# Patient Record
Sex: FEMALE | Race: BLACK OR AFRICAN AMERICAN | NOT HISPANIC OR LATINO | Employment: UNEMPLOYED | URBAN - METROPOLITAN AREA
[De-identification: names, ages, dates, MRNs, and addresses within clinical notes are randomized per-mention and may not be internally consistent; named-entity substitution may affect disease eponyms.]

---

## 2019-09-30 ENCOUNTER — OFFICE VISIT CONVERTED (OUTPATIENT)
Dept: PLASTIC SURGERY | Facility: CLINIC | Age: 29
End: 2019-09-30
Attending: PLASTIC SURGERY

## 2019-09-30 ENCOUNTER — CONVERSION ENCOUNTER (OUTPATIENT)
Dept: OTHER | Facility: HOSPITAL | Age: 29
End: 2019-09-30

## 2019-10-01 ENCOUNTER — HOSPITAL ENCOUNTER (OUTPATIENT)
Dept: MAMMOGRAPHY | Facility: HOSPITAL | Age: 29
Discharge: HOME | End: 2019-10-01
Attending: PLASTIC SURGERY

## 2019-10-08 ENCOUNTER — HOSPITAL ENCOUNTER (OUTPATIENT)
Dept: MAMMOGRAPHY | Facility: HOSPITAL | Age: 29
Discharge: HOME | End: 2019-10-08
Attending: INTERNAL MEDICINE

## 2019-10-30 ENCOUNTER — HOSPITAL ENCOUNTER (OUTPATIENT)
Dept: ULTRASOUND IMAGING | Facility: HOSPITAL | Age: 29
Discharge: HOME | End: 2019-10-30
Attending: PLASTIC SURGERY

## 2019-11-08 ENCOUNTER — OFFICE VISIT CONVERTED (OUTPATIENT)
Dept: PLASTIC SURGERY | Facility: CLINIC | Age: 29
End: 2019-11-08
Attending: PLASTIC SURGERY

## 2019-11-20 ENCOUNTER — HOSPITAL ENCOUNTER (OUTPATIENT)
Dept: PERIOP | Facility: HOSPITAL | Age: 29
Setting detail: HOSPITAL OUTPATIENT SURGERY
Discharge: HOME | End: 2019-11-20
Attending: PLASTIC SURGERY

## 2019-11-20 LAB — HCG UR QL: NEGATIVE

## 2019-11-21 ENCOUNTER — PROCEDURE VISIT CONVERTED (OUTPATIENT)
Dept: OTHER | Facility: HOSPITAL | Age: 29
End: 2019-11-21
Attending: PLASTIC SURGERY

## 2019-11-22 ENCOUNTER — OFFICE VISIT CONVERTED (OUTPATIENT)
Dept: PLASTIC SURGERY | Facility: CLINIC | Age: 29
End: 2019-11-22
Attending: PLASTIC SURGERY

## 2019-11-25 ENCOUNTER — CONVERSION ENCOUNTER (OUTPATIENT)
Dept: PLASTIC SURGERY | Facility: CLINIC | Age: 29
End: 2019-11-25

## 2019-11-25 ENCOUNTER — OFFICE VISIT CONVERTED (OUTPATIENT)
Dept: PLASTIC SURGERY | Facility: CLINIC | Age: 29
End: 2019-11-25
Attending: PLASTIC SURGERY

## 2019-12-09 ENCOUNTER — OFFICE VISIT CONVERTED (OUTPATIENT)
Dept: PLASTIC SURGERY | Facility: CLINIC | Age: 29
End: 2019-12-09
Attending: PLASTIC SURGERY

## 2020-05-04 ENCOUNTER — TELEMEDICINE CONVERTED (OUTPATIENT)
Dept: PLASTIC SURGERY | Facility: CLINIC | Age: 30
End: 2020-05-04
Attending: PLASTIC SURGERY

## 2021-05-13 ENCOUNTER — OFFICE VISIT CONVERTED (OUTPATIENT)
Dept: PLASTIC SURGERY | Facility: CLINIC | Age: 31
End: 2021-05-13
Attending: PLASTIC SURGERY

## 2021-05-13 NOTE — PROGRESS NOTES
"   Progress Note      Patient Name: Rio Sanchez   Patient ID: [id number]   Sex: Female   YOB: 1990    Referring Provider: Jane WILD    Visit Date: May 4, 2020    Provider: Brandi Hendrickson MD   Location: [facility]   Location Address: [address] [place] [address]  [id number]   Location Phone: [phone]          History Of Present Illness  Rio Sanchez is a 30 year old /Black female who presents to the office today as a consult from Jane WILD.   Rio Sanchez is a 29 year old /Black female who presents to the office today for a follow up visit status post breast reduction with free nipple grafts on 11/20/2019. She is doing very well.   Pain Rating on the [place]'s Scale is: none   Video Conferencing Visit  Rio Sanchez is a 30 year old /Black female who is presenting for evaluation via video conferencing. Verbal consent obtained before beginning visit. Doing well, has some numbness on lateral sides of breast. Has had some small pimple like bumps on nipple and some hypopigmentation   The following staff were present during this visit: HARPAL Hendricks       Vitals  Date Time BP Position Site L\R Cuff Size HR RR TEMP (F) WT  HT  BMI kg/m2 BSA m2 O2 Sat HC       12/09/2019 11:32 /83 Sitting    97 - R   313lbs 16oz 5'  6\" 50.68 2.58 100 %          Physical Examination  · Constitutional  o Appearance  o : well developed, well-nourished, Alert and oriented X3  · Respiratory  o Respiratory Effort  o : breathing unlabored   · Skin and Subcutaneous Tissue  o Surgical Incision  o : nipple grafts viable, well healed with with scattered hypopigmentation, good symmetry, improved contour          Assessment  · Macromastia       Hypertrophy of breast     611.1/N62      Plan  · Orders  o Mammogram breast screening 2D digital bilateral. (, 64236) - 611.1/N62 - 06/08/2020   New baseline post breast " reduction  o Plastics reconstructive visit ([facility]) - - 05/04/2020  · Medications  o Medications have been Reconciled  o Transition of Care or Provider Policy  · Instructions  o aquaphor and scar massage, continue bra, f/u after mammo in late June, will discuss potential nipple tattoo at that time to address hypopigmentation            Electronically Signed by: Brandi Hendrickson MD -Author on May 4, 2020 03:08:55 PM

## 2021-05-15 VITALS
SYSTOLIC BLOOD PRESSURE: 117 MMHG | HEART RATE: 107 BPM | OXYGEN SATURATION: 98 % | DIASTOLIC BLOOD PRESSURE: 76 MMHG | HEIGHT: 66 IN

## 2021-05-15 VITALS
SYSTOLIC BLOOD PRESSURE: 109 MMHG | HEART RATE: 76 BPM | DIASTOLIC BLOOD PRESSURE: 68 MMHG | OXYGEN SATURATION: 99 % | HEIGHT: 66 IN

## 2021-05-15 VITALS
OXYGEN SATURATION: 100 % | HEART RATE: 97 BPM | BODY MASS INDEX: 47.09 KG/M2 | DIASTOLIC BLOOD PRESSURE: 83 MMHG | SYSTOLIC BLOOD PRESSURE: 126 MMHG | HEIGHT: 66 IN | WEIGHT: 293 LBS

## 2021-05-15 VITALS
WEIGHT: 293 LBS | HEART RATE: 76 BPM | HEIGHT: 66 IN | OXYGEN SATURATION: 97 % | DIASTOLIC BLOOD PRESSURE: 80 MMHG | BODY MASS INDEX: 47.09 KG/M2 | SYSTOLIC BLOOD PRESSURE: 116 MMHG

## 2021-05-15 VITALS
OXYGEN SATURATION: 99 % | BODY MASS INDEX: 47.09 KG/M2 | DIASTOLIC BLOOD PRESSURE: 92 MMHG | WEIGHT: 293 LBS | SYSTOLIC BLOOD PRESSURE: 140 MMHG | HEART RATE: 87 BPM | HEIGHT: 66 IN

## 2021-06-05 NOTE — PROGRESS NOTES
Progress Note      Patient Name: Rio Sanchez   Patient ID: [id number]   Sex: Female   YOB: 1990    Referring Provider: Jane WILD    Visit Date: May 13, 2021    Provider: Brandi Hendrickson MD   Location: [facility]   Location Address: [address] [place] [address]  [id number]   Location Phone: [phone]          History Of Present Illness  Rio Sanchez is a 31 year old /Black female who presents to the office today as a consult from Jane WILD.   Rio Sanchez is a 29 year old /Black female who presents to the office today for a follow up visit status post breast reduction with free nipple grafts on 11/20/2019. She is doing very well.   Pain Rating on the [place]'s Scale is: none      follow up bilateral breast reduction 11/2019.  Doing well.  Needs mammogram scheduled and has some hypopigmentation of bilateral nipples       Past Medical History  Anxiety; Asthma; Depression; Macromastia         Past Surgical History  breast reduction; Cesarian Section         Medication List  buspirone 10 mg oral tablet; diclofenac sodium oral; nystatin 100,000 unit/gram topical powder         Allergy List  NO KNOWN DRUG ALLERGIES         Family Medical History  Stroke; Heart Disease; Lymphoma, Malignant; Diabetes         Social History  Alcohol Use (Current some day); lives with other; Recreational Drug Use (Never); Single.; Student.; Tobacco (Former)         Physical Examination  · Constitutional  o Appearance  o : well developed, well-nourished, Alert and oriented X3  · Respiratory  o Respiratory Effort  o : breathing unlabored   · Cardiovascular  o Heart  o : regular rate  · Skin and Subcutaneous Tissue  o Surgical Incision  o : Scars well-healed, nipples healed with scattered hypopigmentation, breasts soft, good symmetry          Assessment  · Macromastia       Hypertrophy of  breast     611.1/N62      Plan  · Orders  o Plastics reconstructive visit ([facility]) - - 05/13/2021  o Mammogram breast screening 2D digital bilateral. (07733, ) - 611.1/N62 - 05/13/2021   S/p breast reduction 11/20/2019. New baseline mammogram.   · Medications  o Medications have been Reconciled  o Transition of Care or Provider Policy  · Instructions  o Patient to follow-up after mammogram and will plan on nipple tattoo to address hypopigmentation  o Electronically Identified Patient Education Materials Provided Electronically            Electronically Signed by: Brandi Hendrickson MD -Author on May 14, 2021 06:01:08 PM

## 2021-10-22 ENCOUNTER — HOSPITAL ENCOUNTER (EMERGENCY)
Facility: HOSPITAL | Age: 31
Discharge: HOME | End: 2021-10-22
Attending: EMERGENCY MEDICINE | Admitting: EMERGENCY MEDICINE

## 2021-10-22 VITALS
WEIGHT: 293 LBS | SYSTOLIC BLOOD PRESSURE: 126 MMHG | HEIGHT: 67 IN | RESPIRATION RATE: 16 BRPM | OXYGEN SATURATION: 100 % | DIASTOLIC BLOOD PRESSURE: 68 MMHG | BODY MASS INDEX: 45.99 KG/M2 | HEART RATE: 67 BPM

## 2021-10-22 DIAGNOSIS — K02.9 PAIN DUE TO DENTAL CARIES: Primary | ICD-10-CM

## 2021-10-22 PROCEDURE — 96372 THER/PROPH/DIAG INJ SC/IM: CPT

## 2021-10-22 PROCEDURE — 25010000002 KETOROLAC TROMETHAMINE PER 15 MG: Performed by: PHYSICIAN ASSISTANT

## 2021-10-22 PROCEDURE — 99283 EMERGENCY DEPT VISIT LOW MDM: CPT

## 2021-10-22 RX ORDER — HYDROCODONE BITARTRATE AND ACETAMINOPHEN 5; 325 MG/1; MG/1
1 TABLET ORAL EVERY 6 HOURS PRN
COMMUNITY

## 2021-10-22 RX ORDER — IBUPROFEN 200 MG
200 TABLET ORAL EVERY 6 HOURS PRN
COMMUNITY

## 2021-10-22 RX ORDER — CLINDAMYCIN HYDROCHLORIDE 150 MG/1
150 CAPSULE ORAL 3 TIMES DAILY
COMMUNITY

## 2021-10-22 RX ORDER — KETOROLAC TROMETHAMINE 30 MG/ML
30 INJECTION, SOLUTION INTRAMUSCULAR; INTRAVENOUS ONCE
Status: COMPLETED | OUTPATIENT
Start: 2021-10-22 | End: 2021-10-22

## 2021-10-22 RX ORDER — ACETAMINOPHEN 160 MG/5ML
325 SOLUTION ORAL ONCE
Status: COMPLETED | OUTPATIENT
Start: 2021-10-22 | End: 2021-10-22

## 2021-10-22 RX ORDER — LIDOCAINE HYDROCHLORIDE 20 MG/ML
10 SOLUTION OROPHARYNGEAL
Status: DISCONTINUED | OUTPATIENT
Start: 2021-10-22 | End: 2021-10-22 | Stop reason: HOSPADM

## 2021-10-22 RX ADMIN — BENZOCAINE 2 SPRAY: 200 SPRAY DENTAL; ORAL; PERIODONTAL at 17:11

## 2021-10-22 RX ADMIN — ACETAMINOPHEN 325 MG: 160 SOLUTION ORAL at 17:12

## 2021-10-22 RX ADMIN — KETOROLAC TROMETHAMINE 30 MG: 30 INJECTION, SOLUTION INTRAMUSCULAR; INTRAVENOUS at 17:11

## 2021-10-22 RX ADMIN — LIDOCAINE HYDROCHLORIDE 10 ML: 20 SOLUTION ORAL; TOPICAL at 17:12

## 2021-10-22 NOTE — DISCHARGE INSTRUCTIONS
Patient should continue alternating Tylenol and ibuprofen for pain relief at home.  Alternate hot and cold packs for relief of swelling and pain to the right side of the face.  Continue taking clindamycin as prescribed.  Follow-up with dentist on Monday for tooth extraction.

## 2021-10-22 NOTE — ED PROVIDER NOTES
Subjective   31-year-old female presents to the emergency department with complaints of right-sided dental pain and swelling x2 weeks.  Patient reports seen dentist last week for preliminary assessment prior to having tooth extracted.  She was placed on antibiotics and given Norco's for pain relief.  She states she is continue to have pain and swelling has not gotten any better.  She is scheduled for extraction on Monday, 2021.  Pain currently 9 out of 10, with radiation of the right side of her face into her ear consistent with nerve pain.  Nothing makes pain worse, but icewater makes it better. She denies any fevers or chills.  She has no other complaints or concerns at this time.  Patient here seeking control of pain.      History provided by:  Patient   used: No        Review of Systems   Constitutional: Negative for chills and fever.   HENT: Positive for dental problem. Negative for congestion, ear pain and sore throat.    Eyes: Negative for pain.   Respiratory: Negative for cough, chest tightness and shortness of breath.    Cardiovascular: Negative for chest pain.   Gastrointestinal: Negative for abdominal pain, diarrhea, nausea and vomiting.   Genitourinary: Negative for flank pain and hematuria.   Musculoskeletal: Negative for joint swelling.   Skin: Negative for pallor.   Neurological: Negative for seizures and headaches.   All other systems reviewed and are negative.      Past Medical History:   Diagnosis Date   • Anxiety    • Asthma    • Depression    • Macromastia        No Known Allergies    Past Surgical History:   Procedure Laterality Date   •  SECTION     • REDUCTION MAMMAPLASTY  2019       Family History   Problem Relation Age of Onset   • Lymphoma Other         Non Hodgkins Lymphoma   • Stroke Other    • Heart disease Other    • Diabetes Other        Social History     Socioeconomic History   • Marital status: Single   Tobacco Use   • Smoking status:  Former Smoker   • Tobacco comment: former smoker, smoked 0.5 year   Substance and Sexual Activity   • Alcohol use: Yes     Comment: Current some day  rarely drinks, less than 1 drink per day, has been drinking for less than 1 year   • Drug use: Never           Objective   Physical Exam  Vitals and nursing note reviewed.   Constitutional:       General: She is not in acute distress.     Appearance: Normal appearance. She is not toxic-appearing.   HENT:      Head: Normocephalic and atraumatic.      Mouth/Throat:      Lips: Pink.      Mouth: Mucous membranes are moist. No oral lesions.      Dentition: Abnormal dentition. Dental tenderness, gingival swelling and dental caries present. No dental abscesses.      Tongue: No lesions. Tongue does not deviate from midline.      Palate: No mass and lesions.      Pharynx: Oropharynx is clear. Uvula midline. No pharyngeal swelling, oropharyngeal exudate, posterior oropharyngeal erythema or uvula swelling.      Tonsils: No tonsillar exudate or tonsillar abscesses.      Comments: Poor dentition throughout oral cavity, dental tenderness noted on the right upper and lower side.  Dental caries present.  Mild gingival swelling noted.  No abscess noted.  Mild swelling along the right side of the face.  Eyes:      General: No scleral icterus.  Cardiovascular:      Rate and Rhythm: Normal rate and regular rhythm.      Pulses: Normal pulses.      Heart sounds: Normal heart sounds.   Pulmonary:      Effort: Pulmonary effort is normal. No respiratory distress.      Breath sounds: Normal breath sounds.   Abdominal:      General: Abdomen is flat.      Palpations: Abdomen is soft.      Tenderness: There is no abdominal tenderness.   Musculoskeletal:         General: Normal range of motion.      Cervical back: Normal range of motion and neck supple.   Skin:     General: Skin is warm and dry.   Neurological:      Mental Status: She is alert and oriented to person, place, and time. Mental status  is at baseline.         Procedures           ED Course                                           MDM  Number of Diagnoses or Management Options  Diagnosis management comments: Patient signs and symptoms at this time are consistent with dental pain without obvious signs of infection.  Patient is on clindamycin currently, day 4.  I encouraged the patient to continue taking medications as prescribed.  Physical exam revealed no obvious deformities or abscesses, but did reveal poor dentition for which the patient is already being evaluated for.  Patient should continue to follow directions as given to her by dentist.  She should follow-up with dentist on Monday for extraction of affected tooth.  Vital stable.  Patient resting more comfortably prior to discharge.  Patient is appropriate for discharge at this time with outpatient follow-up with dentist.  She reports no other acute complaints or concerns.    Risk of Complications, Morbidity, and/or Mortality  Presenting problems: low  Diagnostic procedures: low  Management options: low    Patient Progress  Patient progress: stable      Final diagnoses:   Pain due to dental caries       ED Disposition  ED Disposition     ED Disposition Condition Comment    Discharge Stable           No follow-up provider specified.       Medication List      No changes were made to your prescriptions during this visit.          Celina Omalley PA-C  10/22/21 1801